# Patient Record
Sex: FEMALE | Race: WHITE | NOT HISPANIC OR LATINO | Employment: FULL TIME | URBAN - METROPOLITAN AREA
[De-identification: names, ages, dates, MRNs, and addresses within clinical notes are randomized per-mention and may not be internally consistent; named-entity substitution may affect disease eponyms.]

---

## 2024-01-16 PROBLEM — R53.83 FATIGUE: Status: ACTIVE | Noted: 2024-01-16

## 2024-01-16 PROBLEM — E89.0 H/O THYROIDECTOMY: Status: ACTIVE | Noted: 2024-01-16

## 2024-01-16 PROBLEM — E05.90 HYPERTHYROIDISM: Status: ACTIVE | Noted: 2024-01-16

## 2024-01-16 PROBLEM — Z98.890 H/O THYROIDECTOMY: Status: ACTIVE | Noted: 2024-01-16

## 2024-01-16 PROBLEM — Z90.89 H/O THYROIDECTOMY: Status: ACTIVE | Noted: 2024-01-16

## 2024-01-16 NOTE — PROGRESS NOTES
"Subjective   Patient ID:   Leyla Villalpando \"J Carlos" is a 33 y.o. female who presents for No chief complaint on file..  HPI  New patient here today to establish care with myself.  Previous PCP:  Last seen:    Fatigue/Hyperthyroidism:  Taking Synthroid.  Symptoms x  DUE for labs.  Has history of thyroidectomy.    Health maintenance:  Smoking:  Labs: Jan 2022. DUE  Influenza:    Review of Systems  12 point review of systems negative unless stated above in HPI    There were no vitals filed for this visit.    Physical Exam  General: Alert and oriented, well nourished, no acute distress.  Lungs: Clear to auscultation, non-labored respiration.  Heart: Normal rate, regular rhythm, no murmur, gallop or edema.  Neurologic: Awake, alert, and oriented X3, CN II-XII intact.  Psychiatric: Cooperative, appropriate mood and affect.    Assessment/Plan   Diagnoses and all orders for this visit:  Hyperthyroidism  H/O thyroidectomy  Fatigue, unspecified type    "

## 2024-01-18 ENCOUNTER — APPOINTMENT (OUTPATIENT)
Dept: PRIMARY CARE | Facility: CLINIC | Age: 34
End: 2024-01-18
Payer: COMMERCIAL

## 2024-01-18 RX ORDER — LEVOTHYROXINE SODIUM 75 UG/1
75 TABLET ORAL
COMMUNITY

## 2024-01-18 RX ORDER — AZELASTINE HCL 205.5 UG/1
SPRAY NASAL 2 TIMES DAILY
COMMUNITY
Start: 2022-03-08 | End: 2024-01-22 | Stop reason: ALTCHOICE

## 2024-01-18 RX ORDER — DROSPIRENONE AND ETHINYL ESTRADIOL 0.02-3(28)
KIT ORAL EVERY 24 HOURS
COMMUNITY
End: 2024-01-18 | Stop reason: SDUPTHER

## 2024-01-18 RX ORDER — DROSPIRENONE AND ETHINYL ESTRADIOL 0.02-3(28)
KIT ORAL
COMMUNITY
End: 2024-04-23 | Stop reason: SDUPTHER

## 2024-01-18 RX ORDER — HYDROCODONE BITARTRATE AND ACETAMINOPHEN 5; 325 MG/1; MG/1
TABLET ORAL
COMMUNITY
End: 2024-01-22 | Stop reason: ALTCHOICE

## 2024-01-18 NOTE — PROGRESS NOTES
"Subjective   Patient ID:   Leyla Villalpando \"J Carlos" is a 33 y.o. female who presents for Establish Care (/).  HPI  New patient here today to establish care with myself.  Previous PCP: Ashley Rico  Last seen: 2022.    Fatigue/Hypothyroidism:  Taking Synthroid.  Symptoms x months.  DUE for labs.  Has history of partial thyroidectomy in 2022.  States she gets at least 7 hours of sleep.  Having increased fatigue.  No recent labs.    Health maintenance:  Smoking: Former smoker.  Labs: Jan 2022. DUE  Influenza:    Review of Systems  12 point review of systems negative unless stated above in HPI    Vitals:    01/22/24 1512   BP: 124/70   Pulse: 86   SpO2: 99%     Physical Exam  General: Alert and oriented, well nourished, no acute distress.  Lungs: Clear to auscultation, non-labored respiration.  Heart: Normal rate, regular rhythm, no murmur, gallop or edema.  Neurologic: Awake, alert, and oriented X3, CN II-XII intact.  Psychiatric: Cooperative, appropriate mood and affect.    Assessment/Plan   It was nice meeting you!  I have ordered some labs to be done as soon as you can.  We will call you with the results.  Consider a sleep study.  If symptoms persist or worsen despite current plan of care, please contact your healthcare provider for further evaluation.  Patient instructed to contact the office if there are any questions regarding their care or treatment.   [facility] [phone]    Fu 6 months for now  Diagnoses and all orders for this visit:  H/O thyroidectomy  Chronic fatigue  -     Comprehensive Metabolic Panel; Future  -     Lipid Panel; Future  -     CBC and Auto Differential; Future  -     Vitamin D 25-Hydroxy,Total (for eval of Vitamin D levels); Future  -     Vitamin B12; Future  -     Iron and TIBC; Future  -     Magnesium; Future  -     TSH with reflex to Free T4 if abnormal; Future  -     Folate; Future  -     Ferritin; Future  -     SANDOR with Reflex to KAVITA; Future  -     Rheumatoid " Factor; Future  -     C-Reactive Protein; Future  -     Sedimentation Rate; Future  Hypothyroidism, unspecified type

## 2024-01-22 ENCOUNTER — OFFICE VISIT (OUTPATIENT)
Dept: PRIMARY CARE | Facility: CLINIC | Age: 34
End: 2024-01-22
Payer: COMMERCIAL

## 2024-01-22 VITALS
BODY MASS INDEX: 27.48 KG/M2 | HEART RATE: 86 BPM | HEIGHT: 66 IN | DIASTOLIC BLOOD PRESSURE: 70 MMHG | SYSTOLIC BLOOD PRESSURE: 124 MMHG | OXYGEN SATURATION: 99 % | WEIGHT: 171 LBS

## 2024-01-22 DIAGNOSIS — E03.9 HYPOTHYROIDISM, UNSPECIFIED TYPE: ICD-10-CM

## 2024-01-22 DIAGNOSIS — R53.82 CHRONIC FATIGUE: ICD-10-CM

## 2024-01-22 DIAGNOSIS — E89.0 H/O THYROIDECTOMY: Primary | ICD-10-CM

## 2024-01-22 PROCEDURE — 1036F TOBACCO NON-USER: CPT | Performed by: PHYSICIAN ASSISTANT

## 2024-01-22 PROCEDURE — 99214 OFFICE O/P EST MOD 30 MIN: CPT | Performed by: PHYSICIAN ASSISTANT

## 2024-01-22 ASSESSMENT — COLUMBIA-SUICIDE SEVERITY RATING SCALE - C-SSRS
2. HAVE YOU ACTUALLY HAD ANY THOUGHTS OF KILLING YOURSELF?: NO
1. IN THE PAST MONTH, HAVE YOU WISHED YOU WERE DEAD OR WISHED YOU COULD GO TO SLEEP AND NOT WAKE UP?: NO
6. HAVE YOU EVER DONE ANYTHING, STARTED TO DO ANYTHING, OR PREPARED TO DO ANYTHING TO END YOUR LIFE?: NO

## 2024-01-22 ASSESSMENT — PATIENT HEALTH QUESTIONNAIRE - PHQ9
1. LITTLE INTEREST OR PLEASURE IN DOING THINGS: NOT AT ALL
2. FEELING DOWN, DEPRESSED OR HOPELESS: NOT AT ALL
SUM OF ALL RESPONSES TO PHQ9 QUESTIONS 1 AND 2: 0

## 2024-01-22 ASSESSMENT — ENCOUNTER SYMPTOMS
LOSS OF SENSATION IN FEET: 0
DEPRESSION: 0
OCCASIONAL FEELINGS OF UNSTEADINESS: 0

## 2024-01-22 ASSESSMENT — PAIN SCALES - GENERAL: PAINLEVEL: 0-NO PAIN

## 2024-01-23 ENCOUNTER — LAB (OUTPATIENT)
Dept: LAB | Facility: LAB | Age: 34
End: 2024-01-23
Payer: COMMERCIAL

## 2024-01-23 DIAGNOSIS — R53.82 CHRONIC FATIGUE: ICD-10-CM

## 2024-01-23 LAB
25(OH)D3 SERPL-MCNC: 26 NG/ML (ref 31–100)
ALBUMIN SERPL-MCNC: 4.4 G/DL (ref 3.5–5)
ALP BLD-CCNC: 44 U/L (ref 35–125)
ALT SERPL-CCNC: 11 U/L (ref 5–40)
ANION GAP SERPL CALC-SCNC: 12 MMOL/L
AST SERPL-CCNC: 15 U/L (ref 5–40)
BASOPHILS # BLD AUTO: 0.05 X10*3/UL (ref 0–0.1)
BASOPHILS NFR BLD AUTO: 0.8 %
BILIRUB SERPL-MCNC: 0.3 MG/DL (ref 0.1–1.2)
BUN SERPL-MCNC: 18 MG/DL (ref 8–25)
CALCIUM SERPL-MCNC: 9.5 MG/DL (ref 8.5–10.4)
CHLORIDE SERPL-SCNC: 105 MMOL/L (ref 97–107)
CHOLEST SERPL-MCNC: 196 MG/DL (ref 133–200)
CHOLEST/HDLC SERPL: 2.2 {RATIO}
CO2 SERPL-SCNC: 24 MMOL/L (ref 24–31)
CREAT SERPL-MCNC: 1.1 MG/DL (ref 0.4–1.6)
CRP SERPL-MCNC: 1.3 MG/DL (ref 0–2)
EGFRCR SERPLBLD CKD-EPI 2021: 68 ML/MIN/1.73M*2
EOSINOPHIL # BLD AUTO: 0.1 X10*3/UL (ref 0–0.7)
EOSINOPHIL NFR BLD AUTO: 1.5 %
ERYTHROCYTE [DISTWIDTH] IN BLOOD BY AUTOMATED COUNT: 12.7 % (ref 11.5–14.5)
ERYTHROCYTE [SEDIMENTATION RATE] IN BLOOD BY WESTERGREN METHOD: 16 MM/H (ref 0–20)
FERRITIN SERPL-MCNC: 89 NG/ML (ref 13–150)
FOLATE SERPL-MCNC: 15.6 NG/ML (ref 4.2–19.9)
GLUCOSE SERPL-MCNC: 75 MG/DL (ref 65–99)
HCT VFR BLD AUTO: 40.4 % (ref 36–46)
HDLC SERPL-MCNC: 89 MG/DL
HGB BLD-MCNC: 13.4 G/DL (ref 12–16)
IMM GRANULOCYTES # BLD AUTO: 0.01 X10*3/UL (ref 0–0.7)
IMM GRANULOCYTES NFR BLD AUTO: 0.2 % (ref 0–0.9)
IRON SATN MFR SERPL: 28 % (ref 12–50)
IRON SERPL-MCNC: 96 UG/DL (ref 30–160)
LDLC SERPL CALC-MCNC: 82 MG/DL (ref 65–130)
LYMPHOCYTES # BLD AUTO: 1.8 X10*3/UL (ref 1.2–4.8)
LYMPHOCYTES NFR BLD AUTO: 27.8 %
MAGNESIUM SERPL-MCNC: 2.2 MG/DL (ref 1.6–3.1)
MCH RBC QN AUTO: 29.3 PG (ref 26–34)
MCHC RBC AUTO-ENTMCNC: 33.2 G/DL (ref 32–36)
MCV RBC AUTO: 88 FL (ref 80–100)
MONOCYTES # BLD AUTO: 0.45 X10*3/UL (ref 0.1–1)
MONOCYTES NFR BLD AUTO: 7 %
NEUTROPHILS # BLD AUTO: 4.06 X10*3/UL (ref 1.2–7.7)
NEUTROPHILS NFR BLD AUTO: 62.7 %
NRBC BLD-RTO: 0 /100 WBCS (ref 0–0)
PLATELET # BLD AUTO: 282 X10*3/UL (ref 150–450)
POTASSIUM SERPL-SCNC: 5.1 MMOL/L (ref 3.4–5.1)
PROT SERPL-MCNC: 7 G/DL (ref 5.9–7.9)
RBC # BLD AUTO: 4.57 X10*6/UL (ref 4–5.2)
RHEUMATOID FACT SER NEPH-ACNC: <10 IU/ML (ref 0–15)
SODIUM SERPL-SCNC: 141 MMOL/L (ref 133–145)
TIBC SERPL-MCNC: 340 UG/DL (ref 228–428)
TRIGL SERPL-MCNC: 127 MG/DL (ref 40–150)
TSH SERPL DL<=0.05 MIU/L-ACNC: 3.38 MIU/L (ref 0.27–4.2)
UIBC SERPL-MCNC: 244 UG/DL (ref 110–370)
VIT B12 SERPL-MCNC: 709 PG/ML (ref 211–946)
WBC # BLD AUTO: 6.5 X10*3/UL (ref 4.4–11.3)

## 2024-01-23 PROCEDURE — 82306 VITAMIN D 25 HYDROXY: CPT

## 2024-01-23 PROCEDURE — 86140 C-REACTIVE PROTEIN: CPT

## 2024-01-23 PROCEDURE — 80053 COMPREHEN METABOLIC PANEL: CPT

## 2024-01-23 PROCEDURE — 80061 LIPID PANEL: CPT

## 2024-01-23 PROCEDURE — 83735 ASSAY OF MAGNESIUM: CPT

## 2024-01-23 PROCEDURE — 84443 ASSAY THYROID STIM HORMONE: CPT

## 2024-01-23 PROCEDURE — 86038 ANTINUCLEAR ANTIBODIES: CPT

## 2024-01-23 PROCEDURE — 83540 ASSAY OF IRON: CPT

## 2024-01-23 PROCEDURE — 86431 RHEUMATOID FACTOR QUANT: CPT

## 2024-01-23 PROCEDURE — 36415 COLL VENOUS BLD VENIPUNCTURE: CPT

## 2024-01-23 PROCEDURE — 85652 RBC SED RATE AUTOMATED: CPT

## 2024-01-23 PROCEDURE — 82746 ASSAY OF FOLIC ACID SERUM: CPT

## 2024-01-23 PROCEDURE — 82728 ASSAY OF FERRITIN: CPT

## 2024-01-23 PROCEDURE — 85025 COMPLETE CBC W/AUTO DIFF WBC: CPT

## 2024-01-23 PROCEDURE — 83550 IRON BINDING TEST: CPT

## 2024-01-23 PROCEDURE — 82607 VITAMIN B-12: CPT

## 2024-01-23 NOTE — RESULT ENCOUNTER NOTE
Vitamin D is slightly low- I do recommend a 5k daily OTC supplement. All other labs, including thyroid, have been normal so far. Still waiting on 2 more

## 2024-01-23 NOTE — LETTER
"January 23, 2024     Aminta Correiaton  [address] [place] [address]      Dear Ms. Villalpando:    Below are the results from your recent visit:   Vitamin D is slightly low- I do recommend a 5000 Units daily Over The Counter supplement. All other labs, including thyroid, have been normal so far. Still waiting on 2 more results.     Resulted Orders   Comprehensive Metabolic Panel   Result Value Ref Range    Glucose 75 65 - 99 mg/dL    Sodium 141 133 - 145 mmol/L    Potassium 5.1 3.4 - 5.1 mmol/L    Chloride 105 97 - 107 mmol/L    Bicarbonate 24 24 - 31 mmol/L    Urea Nitrogen 18 8 - 25 mg/dL    Creatinine 1.10 0.40 - 1.60 mg/dL    eGFR 68 >60 mL/min/1.73m*2      Comment:      Calculations of estimated GFR are performed using the 2021 CKD-EPI Study Refit equation without the race variable for the IDMS-Traceable creatinine methods.  https://jasn.asnjournals.org/content/early/2021/09/22/ASN.2618949691    Calcium 9.5 8.5 - 10.4 mg/dL    Albumin 4.4 3.5 - 5.0 g/dL    Alkaline Phosphatase 44 35 - 125 U/L    Total Protein 7.0 5.9 - 7.9 g/dL    AST 15 5 - 40 U/L    Bilirubin, Total 0.3 0.1 - 1.2 mg/dL    ALT 11 5 - 40 U/L    Anion Gap 12 <=19 mmol/L   Lipid Panel   Result Value Ref Range    Cholesterol 196 133 - 200 mg/dL    HDL-Cholesterol 89.0 >50.0 mg/dL      Comment:      National Cholesterol Education Program (NCFP) guidelines:  <40 mg/dL: Low HDL-cholesterol (major risk factor for CHD)  >60 mg/dL: High HDL-cholesterol (\"negative\"risk factor for CHD)  HDL-cholesterol is affected by a number of factors (e.g., smoking, exercise, hormones, sex and age).      Cholesterol/HDL Ratio 2.2 SEE COMMENT      Comment:      According to the American Heart Association, the goal is to maintain the total Cholesterol/HDL ratio at 5-to-1, or lower, with an optimum ratio of 3.5-to-1.    LDL Calculated 82 65 - 130 mg/dL    Triglycerides 127 40 - 150 mg/dL   CBC and Auto Differential   Result Value Ref Range    WBC 6.5 4.4 - 11.3 x10*3/uL    " nRBC 0.0 0.0 - 0.0 /100 WBCs    RBC 4.57 4.00 - 5.20 x10*6/uL    Hemoglobin 13.4 12.0 - 16.0 g/dL    Hematocrit 40.4 36.0 - 46.0 %    MCV 88 80 - 100 fL    MCH 29.3 26.0 - 34.0 pg    MCHC 33.2 32.0 - 36.0 g/dL    RDW 12.7 11.5 - 14.5 %    Platelets 282 150 - 450 x10*3/uL    Neutrophils % 62.7 40.0 - 80.0 %    Immature Granulocytes %, Automated 0.2 0.0 - 0.9 %      Comment:      Immature Granulocyte Count (IG) includes promyelocytes, myelocytes and metamyelocytes but does not include bands. Percent differential counts (%) should be interpreted in the context of the absolute cell counts (cells/UL).    Lymphocytes % 27.8 13.0 - 44.0 %    Monocytes % 7.0 2.0 - 10.0 %    Eosinophils % 1.5 0.0 - 6.0 %    Basophils % 0.8 0.0 - 2.0 %    Neutrophils Absolute 4.06 1.20 - 7.70 x10*3/uL      Comment:      Percent differential counts (%) should be interpreted in the context of the absolute cell counts (cells/uL).    Immature Granulocytes Absolute, Automated 0.01 0.00 - 0.70 x10*3/uL    Lymphocytes Absolute 1.80 1.20 - 4.80 x10*3/uL    Monocytes Absolute 0.45 0.10 - 1.00 x10*3/uL    Eosinophils Absolute 0.10 0.00 - 0.70 x10*3/uL    Basophils Absolute 0.05 0.00 - 0.10 x10*3/uL   Vitamin D 25-Hydroxy,Total (for eval of Vitamin D levels)   Result Value Ref Range    Vitamin D, 25-Hydroxy, Total 26 (L) 31 - 100 ng/mL   Vitamin B12   Result Value Ref Range    Vitamin B12 709 211 - 946 pg/mL    Narrative    Deficient= <174 pg/ml  Indeterminate= 175-242 pg/ml   Iron and TIBC   Result Value Ref Range    Iron 96 30 - 160 ug/dL    UIBC 244 110 - 370 ug/dL    TIBC 340 228 - 428 ug/dL    % Saturation 28 12 - 50 %   Magnesium   Result Value Ref Range    Magnesium 2.20 1.60 - 3.10 mg/dL   TSH with reflex to Free T4 if abnormal   Result Value Ref Range    Thyroid Stimulating Hormone 3.38 0.27 - 4.20 mIU/L    Narrative    TSH testing is performed using different testing methodology at [facility] than at other [facility].  Direct result comparisons should only be made within the same method.     Folate   Result Value Ref Range    Folate, Serum 15.6 4.2 - 19.9 ng/mL    Narrative    Deficient=    <2.1 ng/ml  Indeterminate= 2.2-4.1 ng/ml     Ferritin   Result Value Ref Range    Ferritin 89 13 - 150 ng/mL   C-Reactive Protein   Result Value Ref Range    C-Reactive Protein 1.30 0.00 - 2.00 mg/dL   Sedimentation Rate   Result Value Ref Range    Sedimentation Rate 16 0 - 20 mm/h     The test results show that your current treatment is working. Please continue your current medication and plan. .    If you have any questions or concerns, please don't hesitate to call.         Sincerely,    Wood An PA-C

## 2024-01-23 NOTE — LETTER
"January 25, 2024     Aminta Correiaton  [address] [place] [address]      Dear Ms. Villalpando:    Below are the results from your recent visit:  Rheumatoid factor and SANDOR are both NEGATIVE.     Resulted Orders   Comprehensive Metabolic Panel   Result Value Ref Range    Glucose 75 65 - 99 mg/dL    Sodium 141 133 - 145 mmol/L    Potassium 5.1 3.4 - 5.1 mmol/L    Chloride 105 97 - 107 mmol/L    Bicarbonate 24 24 - 31 mmol/L    Urea Nitrogen 18 8 - 25 mg/dL    Creatinine 1.10 0.40 - 1.60 mg/dL    eGFR 68 >60 mL/min/1.73m*2      Comment:      Calculations of estimated GFR are performed using the 2021 CKD-EPI Study Refit equation without the race variable for the IDMS-Traceable creatinine methods.  https://jasn.asnjournals.org/content/early/2021/09/22/ASN.0506941924    Calcium 9.5 8.5 - 10.4 mg/dL    Albumin 4.4 3.5 - 5.0 g/dL    Alkaline Phosphatase 44 35 - 125 U/L    Total Protein 7.0 5.9 - 7.9 g/dL    AST 15 5 - 40 U/L    Bilirubin, Total 0.3 0.1 - 1.2 mg/dL    ALT 11 5 - 40 U/L    Anion Gap 12 <=19 mmol/L   Lipid Panel   Result Value Ref Range    Cholesterol 196 133 - 200 mg/dL    HDL-Cholesterol 89.0 >50.0 mg/dL      Comment:      National Cholesterol Education Program (NCFP) guidelines:  <40 mg/dL: Low HDL-cholesterol (major risk factor for CHD)  >60 mg/dL: High HDL-cholesterol (\"negative\"risk factor for CHD)  HDL-cholesterol is affected by a number of factors (e.g., smoking, exercise, hormones, sex and age).      Cholesterol/HDL Ratio 2.2 SEE COMMENT      Comment:      According to the American Heart Association, the goal is to maintain the total Cholesterol/HDL ratio at 5-to-1, or lower, with an optimum ratio of 3.5-to-1.    LDL Calculated 82 65 - 130 mg/dL    Triglycerides 127 40 - 150 mg/dL   CBC and Auto Differential   Result Value Ref Range    WBC 6.5 4.4 - 11.3 x10*3/uL    nRBC 0.0 0.0 - 0.0 /100 WBCs    RBC 4.57 4.00 - 5.20 x10*6/uL    Hemoglobin 13.4 12.0 - 16.0 g/dL    Hematocrit 40.4 36.0 - 46.0 %    MCV " 88 80 - 100 fL    MCH 29.3 26.0 - 34.0 pg    MCHC 33.2 32.0 - 36.0 g/dL    RDW 12.7 11.5 - 14.5 %    Platelets 282 150 - 450 x10*3/uL    Neutrophils % 62.7 40.0 - 80.0 %    Immature Granulocytes %, Automated 0.2 0.0 - 0.9 %      Comment:      Immature Granulocyte Count (IG) includes promyelocytes, myelocytes and metamyelocytes but does not include bands. Percent differential counts (%) should be interpreted in the context of the absolute cell counts (cells/UL).    Lymphocytes % 27.8 13.0 - 44.0 %    Monocytes % 7.0 2.0 - 10.0 %    Eosinophils % 1.5 0.0 - 6.0 %    Basophils % 0.8 0.0 - 2.0 %    Neutrophils Absolute 4.06 1.20 - 7.70 x10*3/uL      Comment:      Percent differential counts (%) should be interpreted in the context of the absolute cell counts (cells/uL).    Immature Granulocytes Absolute, Automated 0.01 0.00 - 0.70 x10*3/uL    Lymphocytes Absolute 1.80 1.20 - 4.80 x10*3/uL    Monocytes Absolute 0.45 0.10 - 1.00 x10*3/uL    Eosinophils Absolute 0.10 0.00 - 0.70 x10*3/uL    Basophils Absolute 0.05 0.00 - 0.10 x10*3/uL   Vitamin D 25-Hydroxy,Total (for eval of Vitamin D levels)   Result Value Ref Range    Vitamin D, 25-Hydroxy, Total 26 (L) 31 - 100 ng/mL   Vitamin B12   Result Value Ref Range    Vitamin B12 709 211 - 946 pg/mL    Narrative    Deficient= <174 pg/ml  Indeterminate= 175-242 pg/ml   Iron and TIBC   Result Value Ref Range    Iron 96 30 - 160 ug/dL    UIBC 244 110 - 370 ug/dL    TIBC 340 228 - 428 ug/dL    % Saturation 28 12 - 50 %   Magnesium   Result Value Ref Range    Magnesium 2.20 1.60 - 3.10 mg/dL   TSH with reflex to Free T4 if abnormal   Result Value Ref Range    Thyroid Stimulating Hormone 3.38 0.27 - 4.20 mIU/L    Narrative    TSH testing is performed using different testing methodology at [facility] than at other [facility]. Direct result comparisons should only be made within the same method.     Folate   Result Value Ref Range    Folate, Serum 15.6 4.2 - 19.9  ng/mL    Narrative    Deficient=    <2.1 ng/ml  Indeterminate= 2.2-4.1 ng/ml     Ferritin   Result Value Ref Range    Ferritin 89 13 - 150 ng/mL   SANDOR with Reflex to KAVITA   Result Value Ref Range    SANDOR Negative Negative      Comment:      The Antinuclear Antibody (SANDOR) test was performed using  indirect immunofluorescence assay with HEp-2 cells slide.   Rheumatoid Factor   Result Value Ref Range    Rheumatoid Factor <10 0 - 15 IU/mL   C-Reactive Protein   Result Value Ref Range    C-Reactive Protein 1.30 0.00 - 2.00 mg/dL   Sedimentation Rate   Result Value Ref Range    Sedimentation Rate 16 0 - 20 mm/h     The test results show that your current treatment is working. Please continue your current medication and plan. If you have any questions or concerns, please don't hesitate to call.  Sincerely,  Wood An PA-C

## 2024-01-24 LAB — ANA SER QL HEP2 SUBST: NEGATIVE

## 2024-02-22 ENCOUNTER — OFFICE VISIT (OUTPATIENT)
Dept: PRIMARY CARE | Facility: CLINIC | Age: 34
End: 2024-02-22
Payer: COMMERCIAL

## 2024-02-22 VITALS
SYSTOLIC BLOOD PRESSURE: 108 MMHG | BODY MASS INDEX: 27.8 KG/M2 | OXYGEN SATURATION: 98 % | WEIGHT: 173 LBS | HEIGHT: 66 IN | HEART RATE: 78 BPM | DIASTOLIC BLOOD PRESSURE: 82 MMHG

## 2024-02-22 DIAGNOSIS — F33.41 RECURRENT MAJOR DEPRESSIVE DISORDER, IN PARTIAL REMISSION (CMS-HCC): ICD-10-CM

## 2024-02-22 DIAGNOSIS — E03.9 HYPOTHYROIDISM, UNSPECIFIED TYPE: ICD-10-CM

## 2024-02-22 DIAGNOSIS — F41.9 ANXIETY: ICD-10-CM

## 2024-02-22 DIAGNOSIS — E89.0 H/O THYROIDECTOMY: Primary | ICD-10-CM

## 2024-02-22 DIAGNOSIS — R53.82 CHRONIC FATIGUE: ICD-10-CM

## 2024-02-22 PROCEDURE — 1036F TOBACCO NON-USER: CPT | Performed by: PHYSICIAN ASSISTANT

## 2024-02-22 PROCEDURE — 99214 OFFICE O/P EST MOD 30 MIN: CPT | Performed by: PHYSICIAN ASSISTANT

## 2024-02-22 RX ORDER — SERTRALINE HYDROCHLORIDE 25 MG/1
25 TABLET, FILM COATED ORAL DAILY
Qty: 30 TABLET | Refills: 1 | Status: SHIPPED | OUTPATIENT
Start: 2024-02-22 | End: 2024-03-18

## 2024-02-22 ASSESSMENT — PATIENT HEALTH QUESTIONNAIRE - PHQ9
2. FEELING DOWN, DEPRESSED OR HOPELESS: NEARLY EVERY DAY
SUM OF ALL RESPONSES TO PHQ QUESTIONS 1-9: 23
9. THOUGHTS THAT YOU WOULD BE BETTER OFF DEAD, OR OF HURTING YOURSELF: SEVERAL DAYS
10. IF YOU CHECKED OFF ANY PROBLEMS, HOW DIFFICULT HAVE THESE PROBLEMS MADE IT FOR YOU TO DO YOUR WORK, TAKE CARE OF THINGS AT HOME, OR GET ALONG WITH OTHER PEOPLE: VERY DIFFICULT
SUM OF ALL RESPONSES TO PHQ9 QUESTIONS 1 AND 2: 6
1. LITTLE INTEREST OR PLEASURE IN DOING THINGS: NEARLY EVERY DAY
4. FEELING TIRED OR HAVING LITTLE ENERGY: NEARLY EVERY DAY
6. FEELING BAD ABOUT YOURSELF - OR THAT YOU ARE A FAILURE OR HAVE LET YOURSELF OR YOUR FAMILY DOWN: NEARLY EVERY DAY
7. TROUBLE CONCENTRATING ON THINGS, SUCH AS READING THE NEWSPAPER OR WATCHING TELEVISION: NEARLY EVERY DAY
3. TROUBLE FALLING OR STAYING ASLEEP OR SLEEPING TOO MUCH: NEARLY EVERY DAY
5. POOR APPETITE OR OVEREATING: SEVERAL DAYS
8. MOVING OR SPEAKING SO SLOWLY THAT OTHER PEOPLE COULD HAVE NOTICED. OR THE OPPOSITE, BEING SO FIGETY OR RESTLESS THAT YOU HAVE BEEN MOVING AROUND A LOT MORE THAN USUAL: NEARLY EVERY DAY

## 2024-02-22 ASSESSMENT — PAIN SCALES - GENERAL: PAINLEVEL: 0-NO PAIN

## 2024-02-22 ASSESSMENT — ENCOUNTER SYMPTOMS
LOSS OF SENSATION IN FEET: 0
DEPRESSION: 0
OCCASIONAL FEELINGS OF UNSTEADINESS: 0

## 2024-02-22 NOTE — PROGRESS NOTES
"Subjective   Patient ID:   Leyla Villalpando \"J Carlos" is a 33 y.o. female who presents for Depression.  HPI  Depression/Anxiety:  Symptoms x years.  Thinks is getting worse.  Seeing a therapist as well.  She does have off and on suicidal thoughts.  No concrete plans.  Has never been on medication for this before.  Affecting everyday life.    Fatigue/Hypothyroidism:  Taking Synthroid.  Symptoms x months.  Has history of partial thyroidectomy in 2022.  States she gets at least 7 hours of sleep.  Having increased fatigue.  Labs showed slightly low vit D in Jan 2024.    Health maintenance:  Smoking: Former smoker.  Labs: Jan 2024  Influenza:    Review of Systems  12 point review of systems negative unless stated above in HPI    Vitals:    02/22/24 1050   BP: 108/82   Pulse: 78   SpO2: 98%     Physical Exam  General: Alert and oriented, well nourished, no acute distress.  Lungs: Clear to auscultation, non-labored respiration.  Heart: Normal rate, regular rhythm, no murmur, gallop or edema.  Neurologic: Awake, alert, and oriented X3, CN II-XII intact.  Psychiatric: Cooperative, appropriate mood and affect.    Assessment/Plan   It was good seeing you today!  We dicussed options to treat anxiety and depression.  I have sent in low dose Zoloft as a starting point.  This may take 3-4 weeks before noticing the full effects.  We can add in an as needed medication next visit depending on how the Zoloft goes.  Continue seeing the therapist.  If you do have thoughts to harm yourself or others, please call 911/go to the nearest emergency room.  If symptoms persist or worsen despite current plan of care, please contact your healthcare provider for further evaluation.  Patient instructed to contact the office if there are any questions regarding their care or treatment.   [facility] [phone]    Fu 1 month or sooner  Diagnoses and all orders for this visit:  H/O thyroidectomy  Hypothyroidism, unspecified " type  Chronic fatigue  Recurrent major depressive disorder, in partial remission (CMS/HCC)  -     sertraline (Zoloft) 25 mg tablet; Take 1 tablet (25 mg) by mouth once daily.  Anxiety  -     sertraline (Zoloft) 25 mg tablet; Take 1 tablet (25 mg) by mouth once daily.

## 2024-03-11 NOTE — PROGRESS NOTES
"Subjective   Patient ID:   Leyla Villalpando \"J Carlos" is a 33 y.o. female who presents for Follow-up.  HPI  Depression/Anxiety:  We started Zoloft last visit.  This seems to be working well!  Feels the current dose is adequate.  Symptoms x years.  Seeing a therapist as well.  Had never been on medication for this before.  Affecting everyday life.  Denies SI/HI.    Fatigue/Hypothyroidism:  Taking Synthroid.  Last checked Jan 2024.  Symptoms x months.  Has history of partial thyroidectomy in 2022.  States she gets at least 7 hours of sleep.  Having increased fatigue.  Labs showed slightly low vit D in Jan 2024.    Health maintenance:  Smoking: Former smoker.  Labs: Jan 2024  Influenza:    Review of Systems  12 point review of systems negative unless stated above in HPI    Vitals:    03/21/24 1027   BP: 110/78   Pulse: 73   SpO2: 99%     Physical Exam  General: Alert and oriented, well nourished, no acute distress.  Lungs: Clear to auscultation, non-labored respiration.  Heart: Normal rate, regular rhythm, no murmur, gallop or edema.  Neurologic: Awake, alert, and oriented X3, CN II-XII intact.  Psychiatric: Cooperative, appropriate mood and affect.    Assessment/Plan   It was good seeing you!  I am glad the Zoloft if helping!  Please let me know if you feel you need a dosage increase.  We will re-check your thyroid next visit.  Continue the same medications.  Chronic conditions are stable.  Call with questions or concerns.    Follow up  As scheduled  Diagnoses and all orders for this visit:  Recurrent major depressive disorder, in partial remission (CMS/HCC)  Anxiety  Hypothyroidism, unspecified type  H/O thyroidectomy  Chronic fatigue    "

## 2024-03-15 DIAGNOSIS — F33.41 RECURRENT MAJOR DEPRESSIVE DISORDER, IN PARTIAL REMISSION (CMS-HCC): ICD-10-CM

## 2024-03-15 DIAGNOSIS — F41.9 ANXIETY: ICD-10-CM

## 2024-03-18 RX ORDER — SERTRALINE HYDROCHLORIDE 25 MG/1
25 TABLET, FILM COATED ORAL DAILY
Qty: 90 TABLET | Refills: 1 | Status: SHIPPED | OUTPATIENT
Start: 2024-03-18

## 2024-03-21 ENCOUNTER — OFFICE VISIT (OUTPATIENT)
Dept: PRIMARY CARE | Facility: CLINIC | Age: 34
End: 2024-03-21
Payer: COMMERCIAL

## 2024-03-21 VITALS
SYSTOLIC BLOOD PRESSURE: 110 MMHG | HEIGHT: 66 IN | WEIGHT: 167 LBS | BODY MASS INDEX: 26.84 KG/M2 | OXYGEN SATURATION: 99 % | HEART RATE: 73 BPM | DIASTOLIC BLOOD PRESSURE: 78 MMHG

## 2024-03-21 DIAGNOSIS — F41.9 ANXIETY: ICD-10-CM

## 2024-03-21 DIAGNOSIS — E89.0 H/O THYROIDECTOMY: ICD-10-CM

## 2024-03-21 DIAGNOSIS — F33.41 RECURRENT MAJOR DEPRESSIVE DISORDER, IN PARTIAL REMISSION (CMS-HCC): Primary | ICD-10-CM

## 2024-03-21 DIAGNOSIS — E03.9 HYPOTHYROIDISM, UNSPECIFIED TYPE: ICD-10-CM

## 2024-03-21 DIAGNOSIS — R53.82 CHRONIC FATIGUE: ICD-10-CM

## 2024-03-21 PROCEDURE — 1036F TOBACCO NON-USER: CPT | Performed by: PHYSICIAN ASSISTANT

## 2024-03-21 PROCEDURE — 99213 OFFICE O/P EST LOW 20 MIN: CPT | Performed by: PHYSICIAN ASSISTANT

## 2024-03-21 ASSESSMENT — PATIENT HEALTH QUESTIONNAIRE - PHQ9
1. LITTLE INTEREST OR PLEASURE IN DOING THINGS: NOT AT ALL
SUM OF ALL RESPONSES TO PHQ9 QUESTIONS 1 AND 2: 0
2. FEELING DOWN, DEPRESSED OR HOPELESS: NOT AT ALL

## 2024-03-21 ASSESSMENT — ENCOUNTER SYMPTOMS
DEPRESSION: 0
LOSS OF SENSATION IN FEET: 0
OCCASIONAL FEELINGS OF UNSTEADINESS: 0

## 2024-03-21 ASSESSMENT — PAIN SCALES - GENERAL: PAINLEVEL: 0-NO PAIN

## 2024-04-20 DIAGNOSIS — Z30.41 FAMILY PLANNING, BCP (BIRTH CONTROL PILLS) MAINTENANCE: Primary | ICD-10-CM

## 2024-04-22 RX ORDER — DROSPIRENONE AND ETHINYL ESTRADIOL 0.02-3(28)
1 KIT ORAL DAILY
Qty: 84 TABLET | Refills: 3 | OUTPATIENT
Start: 2024-04-22 | End: 2024-07-21

## 2024-04-23 RX ORDER — DROSPIRENONE AND ETHINYL ESTRADIOL 0.02-3(28)
1 KIT ORAL DAILY
Qty: 28 TABLET | Refills: 1 | Status: SHIPPED | OUTPATIENT
Start: 2024-04-23 | End: 2024-04-24 | Stop reason: SDUPTHER

## 2024-04-24 ENCOUNTER — TELEPHONE (OUTPATIENT)
Dept: OBSTETRICS AND GYNECOLOGY | Facility: CLINIC | Age: 34
End: 2024-04-24
Payer: COMMERCIAL

## 2024-04-24 DIAGNOSIS — Z30.41 FAMILY PLANNING, BCP (BIRTH CONTROL PILLS) MAINTENANCE: ICD-10-CM

## 2024-04-24 RX ORDER — DROSPIRENONE AND ETHINYL ESTRADIOL 0.02-3(28)
1 KIT ORAL DAILY
Qty: 90 TABLET | Refills: 0 | Status: SHIPPED | OUTPATIENT
Start: 2024-04-24 | End: 2024-05-22 | Stop reason: SDUPTHER

## 2024-04-24 NOTE — TELEPHONE ENCOUNTER
She was prescribed enough to get her to her appt.  Will resend her 90 day supply with no refills. No additional refills until pt is seen.

## 2024-04-24 NOTE — TELEPHONE ENCOUNTER
Pt called saying pharmacy will only cover a 90 day supply on her bc pills, pt spoke with pharmacy and they told her she was only refilled a 30 day supply. Pt has annual set for 05/22

## 2024-05-22 ENCOUNTER — OFFICE VISIT (OUTPATIENT)
Dept: OBSTETRICS AND GYNECOLOGY | Facility: CLINIC | Age: 34
End: 2024-05-22
Payer: COMMERCIAL

## 2024-05-22 VITALS
WEIGHT: 168 LBS | BODY MASS INDEX: 27 KG/M2 | HEIGHT: 66 IN | DIASTOLIC BLOOD PRESSURE: 74 MMHG | SYSTOLIC BLOOD PRESSURE: 122 MMHG

## 2024-05-22 DIAGNOSIS — Z30.41 FAMILY PLANNING, BCP (BIRTH CONTROL PILLS) MAINTENANCE: Primary | ICD-10-CM

## 2024-05-22 DIAGNOSIS — Z01.419 WELL WOMAN EXAM WITH ROUTINE GYNECOLOGICAL EXAM: ICD-10-CM

## 2024-05-22 PROCEDURE — 99395 PREV VISIT EST AGE 18-39: CPT | Performed by: OBSTETRICS & GYNECOLOGY

## 2024-05-22 RX ORDER — SPIRONOLACTONE 100 MG/1
100 TABLET, FILM COATED ORAL DAILY
COMMUNITY
End: 2024-05-22 | Stop reason: ENTERED-IN-ERROR

## 2024-05-22 RX ORDER — DROSPIRENONE AND ETHINYL ESTRADIOL 0.02-3(28)
1 KIT ORAL DAILY
Qty: 90 TABLET | Refills: 3 | Status: SHIPPED | OUTPATIENT
Start: 2024-05-22 | End: 2024-05-22

## 2024-05-22 RX ORDER — DESOGESTREL AND ETHINYL ESTRADIOL 21-5 (28)
1 KIT ORAL DAILY
Qty: 90 TABLET | Refills: 3 | Status: SHIPPED | OUTPATIENT
Start: 2024-05-22 | End: 2024-05-22 | Stop reason: ENTERED-IN-ERROR

## 2024-05-22 RX ORDER — DROSPIRENONE AND ETHINYL ESTRADIOL 0.02-3(28)
1 KIT ORAL DAILY
Qty: 90 TABLET | Refills: 3 | Status: SHIPPED | OUTPATIENT
Start: 2024-05-22 | End: 2025-05-22

## 2024-05-22 ASSESSMENT — PAIN SCALES - GENERAL: PAINLEVEL: 0-NO PAIN

## 2024-05-22 ASSESSMENT — ENCOUNTER SYMPTOMS
DEPRESSION: 0
OCCASIONAL FEELINGS OF UNSTEADINESS: 0
LOSS OF SENSATION IN FEET: 0

## 2024-05-22 NOTE — PROGRESS NOTES
"Estela Villalpando \"J Carlos" is a 33 y.o. female who is here for a routine exam. Periods are regular every 28-30 days, lasting a few days. Dysmenorrhea:none. Cyclic symptoms include none. No intermenstrual bleeding, spotting, or discharge.  Currently on menses, declines pelvic.    On OCP, with BTB and emotional lability      Last pap:   neg/neg   Last mammogram  Current contraception: OCP (estrogen/progesterone)  History of abnormal Pap smear: no  Family history of uterine or ovarian cancer: no  Regular self breast exam: yes  History of abnormal mammogram: no  Family history of breast cancer: no  History of abnormal lipids: no  Menstrual History:  OB History          1    Para        Term                AB   1    Living             SAB        IAB   1    Ectopic        Multiple        Live Births                      Patient's last menstrual period was 2024 (approximate).         Past Medical History:   Diagnosis Date    Anxiety     Depression     H/O partial thyroidectomy        Past Surgical History:   Procedure Laterality Date    THYROIDECTOMY, PARTIAL      WISDOM TOOTH EXTRACTION         Social History     Socioeconomic History    Marital status: Significant Other     Spouse name: Not on file    Number of children: 0    Years of education: Not on file    Highest education level: Not on file   Occupational History    Not on file   Tobacco Use    Smoking status: Former     Current packs/day: 0.00     Average packs/day: 0.3 packs/day for 10.0 years (2.5 ttl pk-yrs)     Types: Cigarettes     Start date:      Quit date: 2019     Years since quittin.4     Passive exposure: Never    Smokeless tobacco: Never   Vaping Use    Vaping status: Never Used   Substance and Sexual Activity    Alcohol use: Yes     Comment: SOCIAL    Drug use: Never    Sexual activity: Yes     Partners: Male     Birth control/protection: OCP   Other Topics Concern    Not on file   Social History Narrative    " "Not on file     Social Determinants of Health     Financial Resource Strain: Not on file   Food Insecurity: Not on file   Transportation Needs: Not on file   Physical Activity: Not on file   Stress: Not on file   Social Connections: Not on file   Intimate Partner Violence: Not on file   Housing Stability: Not on file       No family history on file.    Prior to Admission medications    Medication Sig Start Date End Date Taking? Authorizing Provider   levothyroxine (Synthroid, Levoxyl) 75 mcg tablet Take 1 tablet (75 mcg) by mouth once daily in the morning. Take before meals.   Yes Historical Provider, MD   sertraline (Zoloft) 25 mg tablet TAKE 1 TABLET BY MOUTH EVERY DAY 3/18/24  Yes Wood An PA-C   drospirenone-ethinyl estradioL (Vestura, 28,) 3-0.02 mg tablet Take 1 tablet by mouth once daily. 4/24/24 5/22/24 Yes Mary Mendoza DO   drospirenone-ethinyl estradioL (Vestura, 28,) 3-0.02 mg tablet Take 1 tablet by mouth once daily. 5/22/24 5/22/25  Mary Mendoza DO       Allergies   Allergen Reactions    Loratadine Hives and Rash    Dog Dander Unknown    Duck Feathers Allergenic Extract Unknown    Grass Pollen-[place], Standard Unknown    House Dust Mite Unknown    Mold Unknown    Other Unknown    Penicillin V Hives     Pill form    Ragweed Unknown    Weed Pollen-Short Ragweed Unknown              Objective   /74   Ht 1.676 m (5' 6\")   Wt 76.2 kg (168 lb)   LMP 05/01/2024 (Approximate)   BMI 27.12 kg/m²     Physical Exam  Vitals and nursing note reviewed.   Constitutional:       General: She is not in acute distress.     Appearance: Normal appearance. She is not ill-appearing.   HENT:      Head: Normocephalic and atraumatic.      Mouth/Throat:      Mouth: Mucous membranes are moist.      Pharynx: Oropharynx is clear.   Eyes:      Extraocular Movements: Extraocular movements intact.      Conjunctiva/sclera: Conjunctivae normal.      Pupils: Pupils are equal, round, and reactive to light. "   Neck:      Thyroid: No thyroid mass, thyromegaly or thyroid tenderness.   Cardiovascular:      Rate and Rhythm: Normal rate and regular rhythm.      Pulses: Normal pulses.      Heart sounds: Normal heart sounds. No murmur heard.  Pulmonary:      Effort: Pulmonary effort is normal.      Breath sounds: No wheezing or rhonchi.   Chest:   Breasts:     Right: Normal. No swelling, bleeding, inverted nipple, mass, nipple discharge, skin change or tenderness.      Left: Normal. No swelling, bleeding, inverted nipple, mass, nipple discharge, skin change or tenderness.   Abdominal:      General: Bowel sounds are normal. There is no distension.      Palpations: There is no mass.      Tenderness: There is no abdominal tenderness. There is no guarding or rebound.      Hernia: No hernia is present. There is no hernia in the left inguinal area or right inguinal area.   Genitourinary:     General: Normal vulva.      Pubic Area: No rash.       Labia:         Right: No rash, tenderness, lesion or injury.         Left: No rash, tenderness, lesion or injury.       Urethra: No prolapse or urethral swelling.      Vagina: No signs of injury. No vaginal discharge, tenderness or prolapsed vaginal walls.      Cervix: No cervical motion tenderness, discharge, friability, lesion, erythema or cervical bleeding.      Uterus: Not deviated, not enlarged, not fixed, not tender and no uterine prolapse.       Adnexa:         Right: No mass, tenderness or fullness.          Left: No mass, tenderness or fullness.     Musculoskeletal:         General: No swelling, tenderness, deformity or signs of injury. Normal range of motion.      Cervical back: No rigidity.   Lymphadenopathy:      Cervical: No cervical adenopathy.      Upper Body:      Right upper body: No axillary adenopathy.      Left upper body: No axillary adenopathy.      Lower Body: No right inguinal adenopathy. No left inguinal adenopathy.   Skin:     General: Skin is warm.      Findings:  No lesion or rash.   Neurological:      General: No focal deficit present.      Mental Status: She is alert and oriented to person, place, and time.   Psychiatric:         Mood and Affect: Mood normal.         Behavior: Behavior normal.         Thought Content: Thought content normal.         Judgment: Judgment normal.         Assessment    Problem List Items Addressed This Visit    None  Visit Diagnoses       Family planning, BCP (birth control pills) maintenance    -  Primary    Relevant Medications    drospirenone-ethinyl estradioL (Vestura, 28,) 3-0.02 mg tablet    Well woman exam with routine gynecological exam                 Follow up in about 1 year (around 5/22/2025) for well woman exam.   All questions answered.  Breast self exam technique reviewed and patient encouraged to perform self-exam monthly.  Contraception: OCP (estrogen/progesterone).  Diagnosis explained in detail, including differential.  Discussed healthy lifestyle modifications..

## 2024-06-18 DIAGNOSIS — F33.41 RECURRENT MAJOR DEPRESSIVE DISORDER, IN PARTIAL REMISSION (CMS-HCC): ICD-10-CM

## 2024-06-18 DIAGNOSIS — F41.9 ANXIETY: ICD-10-CM

## 2024-06-18 RX ORDER — SERTRALINE HYDROCHLORIDE 25 MG/1
25 TABLET, FILM COATED ORAL DAILY
Qty: 90 TABLET | Refills: 1 | Status: SHIPPED | OUTPATIENT
Start: 2024-06-18

## 2024-06-24 NOTE — PROGRESS NOTES
"Subjective   Patient ID:   Leyla Villalpando \"J Carlos" is a 33 y.o. female who presents for Follow-up.  HPI  Depression/Anxiety:  Taking Zoloft.  This seems to be working well!  Feels the current dose is adequate.  Symptoms x years.  Seeing a therapist as well.  Denies SI/HI.    Fatigue/Hypothyroidism:  Taking Synthroid.  Last checked Jan 2024.  DUE for re-check.  Symptoms x months.  Has history of partial thyroidectomy in 2022.  States she gets at least 7 hours of sleep.  Having increased fatigue.  Labs showed slightly low vit D in Jan 2024.    Health maintenance:  Smoking: Former smoker.  Labs: Jan 2024. DUE for TSH  Influenza:    Review of Systems  12 point review of systems negative unless stated above in HPI    Vitals:    07/02/24 0903   BP: 118/80   Pulse: 82   SpO2: 99%     Physical Exam  General: Alert and oriented, well nourished, no acute distress.  Lungs: Clear to auscultation, non-labored respiration.  Heart: Normal rate, regular rhythm, no murmur, gallop or edema.  Neurologic: Awake, alert, and oriented X3, CN II-XII intact.  Psychiatric: Cooperative, appropriate mood and affect.    Assessment/Plan   I am glad you are well!  I have ordered some labs to be done as soon as you can.  We will call you with the results.  We will do your yearly labs with our next visit.  Continue the same medications.  Chronic conditions are stable.  Call with questions or concerns.    Follow up  In Jan for physical  Diagnoses and all orders for this visit:  Recurrent major depressive disorder, in partial remission (CMS-HCC)  Anxiety  Hypothyroidism, unspecified type  -     TSH with reflex to Free T4 if abnormal; Future  H/O thyroidectomy  Chronic fatigue  Vitamin D deficiency    "

## 2024-07-02 ENCOUNTER — OFFICE VISIT (OUTPATIENT)
Dept: PRIMARY CARE | Facility: CLINIC | Age: 34
End: 2024-07-02
Payer: COMMERCIAL

## 2024-07-02 VITALS
SYSTOLIC BLOOD PRESSURE: 118 MMHG | DIASTOLIC BLOOD PRESSURE: 80 MMHG | HEART RATE: 82 BPM | BODY MASS INDEX: 27.54 KG/M2 | OXYGEN SATURATION: 99 % | WEIGHT: 170.6 LBS

## 2024-07-02 DIAGNOSIS — E03.9 HYPOTHYROIDISM, UNSPECIFIED TYPE: ICD-10-CM

## 2024-07-02 DIAGNOSIS — R53.82 CHRONIC FATIGUE: ICD-10-CM

## 2024-07-02 DIAGNOSIS — F33.41 RECURRENT MAJOR DEPRESSIVE DISORDER, IN PARTIAL REMISSION (CMS-HCC): Primary | ICD-10-CM

## 2024-07-02 DIAGNOSIS — E89.0 H/O THYROIDECTOMY: ICD-10-CM

## 2024-07-02 DIAGNOSIS — F41.9 ANXIETY: ICD-10-CM

## 2024-07-02 DIAGNOSIS — E55.9 VITAMIN D DEFICIENCY: ICD-10-CM

## 2024-07-02 PROBLEM — E05.90 HYPERTHYROIDISM: Status: RESOLVED | Noted: 2024-01-16 | Resolved: 2024-07-02

## 2024-07-02 PROCEDURE — 99213 OFFICE O/P EST LOW 20 MIN: CPT | Performed by: PHYSICIAN ASSISTANT

## 2024-07-02 PROCEDURE — 1036F TOBACCO NON-USER: CPT | Performed by: PHYSICIAN ASSISTANT

## 2024-07-02 ASSESSMENT — ENCOUNTER SYMPTOMS
LOSS OF SENSATION IN FEET: 0
DEPRESSION: 0
OCCASIONAL FEELINGS OF UNSTEADINESS: 0

## 2024-07-02 ASSESSMENT — LIFESTYLE VARIABLES
HOW MANY STANDARD DRINKS CONTAINING ALCOHOL DO YOU HAVE ON A TYPICAL DAY: 1 OR 2
HOW OFTEN DO YOU HAVE SIX OR MORE DRINKS ON ONE OCCASION: NEVER
HOW OFTEN DO YOU HAVE A DRINK CONTAINING ALCOHOL: MONTHLY OR LESS
AUDIT-C TOTAL SCORE: 1
SKIP TO QUESTIONS 9-10: 1

## 2024-07-02 ASSESSMENT — PAIN SCALES - GENERAL: PAINLEVEL: 0-NO PAIN

## 2024-07-02 ASSESSMENT — PATIENT HEALTH QUESTIONNAIRE - PHQ9
SUM OF ALL RESPONSES TO PHQ9 QUESTIONS 1 AND 2: 0
1. LITTLE INTEREST OR PLEASURE IN DOING THINGS: NOT AT ALL
2. FEELING DOWN, DEPRESSED OR HOPELESS: NOT AT ALL

## 2024-09-05 DIAGNOSIS — E89.0 H/O THYROIDECTOMY: Primary | ICD-10-CM

## 2024-09-05 RX ORDER — LEVOTHYROXINE SODIUM 75 UG/1
75 TABLET ORAL
Qty: 90 TABLET | Refills: 3 | Status: SHIPPED | OUTPATIENT
Start: 2024-09-05 | End: 2024-12-04

## 2025-01-06 NOTE — PROGRESS NOTES
"Subjective   Patient ID:   Leyla Villalpando \"J Carlos" is a 34 y.o. female who presents for No chief complaint on file..  HPI  Depression/Anxiety:  Taking Zoloft.  This seems to be working well!  Feels the current dose is adequate.  Symptoms x years.  Seeing a therapist as well.  Denies SI/HI.    Fatigue/Hypothyroidism:  Taking Synthroid.  Last checked Jan 2024.  DUE for re-check.  Symptoms x months.  Has history of partial thyroidectomy in 2022.  States she gets at least 7 hours of sleep.  Having increased fatigue.  Labs showed slightly low vit D in Jan 2024.    Health maintenance:  Smoking: Former smoker.  Labs: Jan 2024. DUE  Influenza:    Review of Systems  12 point review of systems negative unless stated above in HPI    There were no vitals filed for this visit.    Physical Exam  General: Alert and oriented, well nourished, no acute distress.  Lungs: Clear to auscultation, non-labored respiration.  Heart: Normal rate, regular rhythm, no murmur, gallop or edema.  Neurologic: Awake, alert, and oriented X3, CN II-XII intact.  Psychiatric: Cooperative, appropriate mood and affect.    Assessment/Plan     Diagnoses and all orders for this visit:  Routine general medical examination at a health care facility  Recurrent major depressive disorder, in partial remission (CMS-HCC)  Anxiety  Hypothyroidism, unspecified type  H/O thyroidectomy  Chronic fatigue  Vitamin D deficiency    "

## 2025-01-10 DIAGNOSIS — F33.41 RECURRENT MAJOR DEPRESSIVE DISORDER, IN PARTIAL REMISSION (CMS-HCC): ICD-10-CM

## 2025-01-10 DIAGNOSIS — F41.9 ANXIETY: ICD-10-CM

## 2025-01-10 RX ORDER — SERTRALINE HYDROCHLORIDE 25 MG/1
25 TABLET, FILM COATED ORAL DAILY
Qty: 90 TABLET | Refills: 1 | Status: SHIPPED | OUTPATIENT
Start: 2025-01-10

## 2025-01-14 ENCOUNTER — OFFICE VISIT (OUTPATIENT)
Dept: PRIMARY CARE | Facility: CLINIC | Age: 35
End: 2025-01-14
Payer: COMMERCIAL

## 2025-01-14 ENCOUNTER — LAB (OUTPATIENT)
Dept: LAB | Facility: LAB | Age: 35
End: 2025-01-14
Payer: COMMERCIAL

## 2025-01-14 VITALS
WEIGHT: 176.2 LBS | HEIGHT: 66 IN | BODY MASS INDEX: 28.32 KG/M2 | HEART RATE: 64 BPM | OXYGEN SATURATION: 99 % | SYSTOLIC BLOOD PRESSURE: 102 MMHG | DIASTOLIC BLOOD PRESSURE: 80 MMHG

## 2025-01-14 DIAGNOSIS — Z00.00 ROUTINE GENERAL MEDICAL EXAMINATION AT A HEALTH CARE FACILITY: ICD-10-CM

## 2025-01-14 DIAGNOSIS — R53.82 CHRONIC FATIGUE: ICD-10-CM

## 2025-01-14 DIAGNOSIS — E03.9 HYPOTHYROIDISM, UNSPECIFIED TYPE: ICD-10-CM

## 2025-01-14 DIAGNOSIS — E55.9 VITAMIN D DEFICIENCY: ICD-10-CM

## 2025-01-14 DIAGNOSIS — Z00.00 ROUTINE GENERAL MEDICAL EXAMINATION AT A HEALTH CARE FACILITY: Primary | ICD-10-CM

## 2025-01-14 DIAGNOSIS — F33.41 RECURRENT MAJOR DEPRESSIVE DISORDER, IN PARTIAL REMISSION (CMS-HCC): ICD-10-CM

## 2025-01-14 DIAGNOSIS — E89.0 H/O THYROIDECTOMY: ICD-10-CM

## 2025-01-14 DIAGNOSIS — F41.9 ANXIETY: ICD-10-CM

## 2025-01-14 LAB
25(OH)D3 SERPL-MCNC: 28 NG/ML (ref 30–100)
ALBUMIN SERPL BCP-MCNC: 4.2 G/DL (ref 3.4–5)
ALP SERPL-CCNC: 34 U/L (ref 33–110)
ALT SERPL W P-5'-P-CCNC: 12 U/L (ref 7–45)
ANION GAP SERPL CALCULATED.3IONS-SCNC: 11 MMOL/L (ref 10–20)
AST SERPL W P-5'-P-CCNC: 16 U/L (ref 9–39)
BASOPHILS # BLD AUTO: 0.04 X10*3/UL (ref 0–0.1)
BASOPHILS NFR BLD AUTO: 0.6 %
BILIRUB SERPL-MCNC: 0.4 MG/DL (ref 0–1.2)
BUN SERPL-MCNC: 17 MG/DL (ref 6–23)
CALCIUM SERPL-MCNC: 9.2 MG/DL (ref 8.6–10.3)
CHLORIDE SERPL-SCNC: 105 MMOL/L (ref 98–107)
CHOLEST SERPL-MCNC: 207 MG/DL (ref 0–199)
CHOLEST/HDLC SERPL: 2.2 {RATIO}
CO2 SERPL-SCNC: 28 MMOL/L (ref 21–32)
CREAT SERPL-MCNC: 1 MG/DL (ref 0.5–1.05)
EGFRCR SERPLBLD CKD-EPI 2021: 76 ML/MIN/1.73M*2
EOSINOPHIL # BLD AUTO: 0.09 X10*3/UL (ref 0–0.7)
EOSINOPHIL NFR BLD AUTO: 1.4 %
ERYTHROCYTE [DISTWIDTH] IN BLOOD BY AUTOMATED COUNT: 12.8 % (ref 11.5–14.5)
GLUCOSE SERPL-MCNC: 81 MG/DL (ref 74–99)
HCT VFR BLD AUTO: 38.3 % (ref 36–46)
HDLC SERPL-MCNC: 92.8 MG/DL
HGB BLD-MCNC: 12.9 G/DL (ref 12–16)
IMM GRANULOCYTES # BLD AUTO: 0.02 X10*3/UL (ref 0–0.7)
IMM GRANULOCYTES NFR BLD AUTO: 0.3 % (ref 0–0.9)
LDLC SERPL CALC-MCNC: 87 MG/DL
LYMPHOCYTES # BLD AUTO: 1.91 X10*3/UL (ref 1.2–4.8)
LYMPHOCYTES NFR BLD AUTO: 28.9 %
MCH RBC QN AUTO: 30.1 PG (ref 26–34)
MCHC RBC AUTO-ENTMCNC: 33.7 G/DL (ref 32–36)
MCV RBC AUTO: 89 FL (ref 80–100)
MONOCYTES # BLD AUTO: 0.44 X10*3/UL (ref 0.1–1)
MONOCYTES NFR BLD AUTO: 6.7 %
NEUTROPHILS # BLD AUTO: 4.1 X10*3/UL (ref 1.2–7.7)
NEUTROPHILS NFR BLD AUTO: 62.1 %
NON HDL CHOLESTEROL: 114 MG/DL (ref 0–149)
NRBC BLD-RTO: 0 /100 WBCS (ref 0–0)
PLATELET # BLD AUTO: 247 X10*3/UL (ref 150–450)
POTASSIUM SERPL-SCNC: 4.8 MMOL/L (ref 3.5–5.3)
PROT SERPL-MCNC: 7 G/DL (ref 6.4–8.2)
RBC # BLD AUTO: 4.29 X10*6/UL (ref 4–5.2)
SODIUM SERPL-SCNC: 139 MMOL/L (ref 136–145)
TRIGL SERPL-MCNC: 134 MG/DL (ref 0–149)
TSH SERPL-ACNC: 3.64 MIU/L (ref 0.44–3.98)
VLDL: 27 MG/DL (ref 0–40)
WBC # BLD AUTO: 6.6 X10*3/UL (ref 4.4–11.3)

## 2025-01-14 PROCEDURE — 99213 OFFICE O/P EST LOW 20 MIN: CPT | Performed by: PHYSICIAN ASSISTANT

## 2025-01-14 PROCEDURE — 80053 COMPREHEN METABOLIC PANEL: CPT

## 2025-01-14 PROCEDURE — 84443 ASSAY THYROID STIM HORMONE: CPT

## 2025-01-14 PROCEDURE — 3008F BODY MASS INDEX DOCD: CPT | Performed by: PHYSICIAN ASSISTANT

## 2025-01-14 PROCEDURE — 1036F TOBACCO NON-USER: CPT | Performed by: PHYSICIAN ASSISTANT

## 2025-01-14 PROCEDURE — 82306 VITAMIN D 25 HYDROXY: CPT

## 2025-01-14 PROCEDURE — 80061 LIPID PANEL: CPT

## 2025-01-14 PROCEDURE — 85025 COMPLETE CBC W/AUTO DIFF WBC: CPT

## 2025-01-14 PROCEDURE — 99395 PREV VISIT EST AGE 18-39: CPT | Performed by: PHYSICIAN ASSISTANT

## 2025-01-14 RX ORDER — BUPROPION HYDROCHLORIDE 150 MG/1
150 TABLET ORAL EVERY MORNING
Qty: 30 TABLET | Refills: 1 | Status: SHIPPED | OUTPATIENT
Start: 2025-01-14 | End: 2025-03-15

## 2025-01-14 ASSESSMENT — PATIENT HEALTH QUESTIONNAIRE - PHQ9
SUM OF ALL RESPONSES TO PHQ9 QUESTIONS 1 AND 2: 0
2. FEELING DOWN, DEPRESSED OR HOPELESS: NOT AT ALL
1. LITTLE INTEREST OR PLEASURE IN DOING THINGS: NOT AT ALL

## 2025-01-14 ASSESSMENT — PAIN SCALES - GENERAL: PAINLEVEL_OUTOF10: 0-NO PAIN

## 2025-01-14 ASSESSMENT — LIFESTYLE VARIABLES
HOW OFTEN DO YOU HAVE A DRINK CONTAINING ALCOHOL: MONTHLY OR LESS
SKIP TO QUESTIONS 9-10: 1
AUDIT-C TOTAL SCORE: 1
HOW MANY STANDARD DRINKS CONTAINING ALCOHOL DO YOU HAVE ON A TYPICAL DAY: PATIENT DOES NOT DRINK
HOW OFTEN DO YOU HAVE SIX OR MORE DRINKS ON ONE OCCASION: NEVER

## 2025-01-14 ASSESSMENT — ENCOUNTER SYMPTOMS
LOSS OF SENSATION IN FEET: 0
OCCASIONAL FEELINGS OF UNSTEADINESS: 0
DEPRESSION: 0

## 2025-01-14 NOTE — PROGRESS NOTES
"Subjective   Patient ID:   Leyla Villalpando \"J Carlos" is a 34 y.o. female who presents for Annual Exam.  HPI  Pain scale: 0 (no pain)  Living will? yes  POA? yes  Are you currently or have you recently been threatened or abused? No  Do you feel unsafe going back to the place you are living? Yes  Reported health: Good  Dental visits? Yes  Hearing problems? No  Vision problems? No  Healthy diet? Yes  Exercise? Exercise 1-3x per week  Adequate fluid intake? Yes    Depression/Anxiety:  Taking Zoloft.  Unsure if this is still helpful.  Having increased depression.  Also has noticed weight gain with the Zoloft.  Symptoms x years.  Seeing a therapist as well.  Denies SI/HI.    Fatigue/Hypothyroidism:  Taking Synthroid.  Last checked 2024.  DUE for re-check.  Symptoms x months.  Has history of partial thyroidectomy in .  States she gets at least 7 hours of sleep.  Having increased fatigue.  Labs showed slightly low vit D in 2024.    Health maintenance:  Smoking: Former smoker.  Labs: 2024. DUE  Influenza:    Social History     Tobacco Use    Smoking status: Former     Current packs/day: 0.00     Average packs/day: 0.3 packs/day for 10.0 years (2.5 ttl pk-yrs)     Types: Cigarettes     Start date:      Quit date: 2019     Years since quittin.0     Passive exposure: Never    Smokeless tobacco: Never   Vaping Use    Vaping status: Never Used   Substance Use Topics    Alcohol use: Yes     Comment: SOCIAL    Drug use: Never       Immunization History   Administered Date(s) Administered    Moderna SARS-CoV-2 Vaccination 2021, 2021, 2022     Review of Systems  12 point review of systems negative unless stated above in HPI    Vitals:    25 0853   BP: 102/80   Pulse: 64   SpO2: 99%     Physical Exam  General: Alert and oriented, well nourished, no acute distress.  Lungs: Clear to auscultation, non-labored respiration.  Heart: Normal rate, regular rhythm, no murmur, gallop or " edema.  Neurologic: Awake, alert, and oriented X3, CN II-XII intact.  Psychiatric: Cooperative, appropriate mood and affect.    Assessment/Plan   It was good seeing you!  This counts as your annual Wellness visit.  Health maintenance was reviewed today.  I have ordered some labs to be done as soon as you can.  We will call you with the results.  Let's STOP the Zoloft.  Let's START Wellbutrin for mood.  This may help with weight loss as well.  If symptoms persist or worsen despite current plan of care, please contact your healthcare provider for further evaluation.  Patient instructed to contact the office if there are any questions regarding their care or treatment.   [facility] [phone]    Fu 1 month for mood  Diagnoses and all orders for this visit:  Routine general medical examination at a health care facility  -     Comprehensive Metabolic Panel; Future  -     Lipid Panel; Future  -     CBC and Auto Differential; Future  Recurrent major depressive disorder, in partial remission (CMS-HCC)  -     buPROPion XL (Wellbutrin XL) 150 mg 24 hr tablet; Take 1 tablet (150 mg) by mouth once daily in the morning. Do not crush, chew, or split.  Anxiety  Hypothyroidism, unspecified type  -     TSH with reflex to Free T4 if abnormal; Future  H/O thyroidectomy  Chronic fatigue  Vitamin D deficiency  -     Vitamin D 25-Hydroxy,Total (for eval of Vitamin D levels); Future

## 2025-02-07 DIAGNOSIS — F33.41 RECURRENT MAJOR DEPRESSIVE DISORDER, IN PARTIAL REMISSION (CMS-HCC): ICD-10-CM

## 2025-02-10 RX ORDER — BUPROPION HYDROCHLORIDE 150 MG/1
150 TABLET ORAL EVERY MORNING
Qty: 90 TABLET | Refills: 1 | Status: SHIPPED | OUTPATIENT
Start: 2025-02-10 | End: 2025-08-09

## 2025-02-10 NOTE — PROGRESS NOTES
"Subjective   Patient ID:   Leyla Villalpando \"J Carlos" is a 34 y.o. female who presents for Follow-up.  HPI  Depression/Anxiety:  We stopped Zoloft due to weight gain/ineffectiveness last visit.  We switched to Wellbutrin.  Symptoms x years.  Seeing a therapist as well.  Denies SI/HI.    Hip pain:  Right sided.  Radiates down to the knee.  No acute injury.  Bothers at rest.  Worse with exertion.  Has been doing more running recently.    Fatigue/Hypothyroidism:  Taking Synthroid.  Last checked 2025.  Symptoms x months.  Has history of partial thyroidectomy in .  States she gets at least 7 hours of sleep.  Having increased fatigue.  Labs showed slightly low vit D in 2025.    Health maintenance:  Smoking: Former smoker.  Labs: 2025.  Influenza:    Social History     Tobacco Use    Smoking status: Former     Current packs/day: 0.00     Average packs/day: 0.3 packs/day for 10.0 years (2.5 ttl pk-yrs)     Types: Cigarettes     Start date:      Quit date:      Years since quittin.1     Passive exposure: Never    Smokeless tobacco: Never   Vaping Use    Vaping status: Never Used   Substance Use Topics    Alcohol use: Yes     Comment: SOCIAL    Drug use: Never       Immunization History   Administered Date(s) Administered    Moderna SARS-CoV-2 Vaccination 2021, 2021, 2022     Review of Systems  12 point review of systems negative unless stated above in HPI    Vitals:    25 0903   BP: 98/62   Pulse: 71   SpO2: 98%     Physical Exam  General: Alert and oriented, well nourished, no acute distress.  Lungs: Clear to auscultation, non-labored respiration.  Heart: Normal rate, regular rhythm, no murmur, gallop or edema.  Neurologic: Awake, alert, and oriented X3, CN II-XII intact.  Psychiatric: Cooperative, appropriate mood and affect.    Assessment/Plan   It was good seeing you!  I am glad the Wellbutrin is helping!  I have placed a referral to physical therapy for further " management.  Consider imaging/ortho as well.  We will check your thyroid next visit.  If symptoms persist or worsen despite current plan of care, please contact your healthcare provider for further evaluation.  Patient instructed to contact the office if there are any questions regarding their care or treatment.   [facility] [phone]  Continue the same medications.  Chronic conditions are stable.  Call with questions or concerns.    Follow up  6 months  Diagnoses and all orders for this visit:  Recurrent major depressive disorder, in partial remission (CMS-HCC)  Anxiety  Hypothyroidism, unspecified type  H/O thyroidectomy  -     levothyroxine (Synthroid, Levoxyl) 75 mcg tablet; Take 1 tablet (75 mcg) by mouth once daily in the morning. Take before meals.  Chronic fatigue  Vitamin D deficiency  Right hip pain  -     Referral to Physical Therapy; Future

## 2025-02-17 ENCOUNTER — OFFICE VISIT (OUTPATIENT)
Dept: PRIMARY CARE | Facility: CLINIC | Age: 35
End: 2025-02-17
Payer: COMMERCIAL

## 2025-02-17 VITALS
BODY MASS INDEX: 28.68 KG/M2 | HEART RATE: 71 BPM | SYSTOLIC BLOOD PRESSURE: 98 MMHG | WEIGHT: 175 LBS | DIASTOLIC BLOOD PRESSURE: 62 MMHG | OXYGEN SATURATION: 98 %

## 2025-02-17 DIAGNOSIS — R53.82 CHRONIC FATIGUE: ICD-10-CM

## 2025-02-17 DIAGNOSIS — F33.41 RECURRENT MAJOR DEPRESSIVE DISORDER, IN PARTIAL REMISSION (CMS-HCC): Primary | ICD-10-CM

## 2025-02-17 DIAGNOSIS — F41.9 ANXIETY: ICD-10-CM

## 2025-02-17 DIAGNOSIS — E89.0 H/O THYROIDECTOMY: ICD-10-CM

## 2025-02-17 DIAGNOSIS — M25.551 RIGHT HIP PAIN: ICD-10-CM

## 2025-02-17 DIAGNOSIS — E03.9 HYPOTHYROIDISM, UNSPECIFIED TYPE: ICD-10-CM

## 2025-02-17 DIAGNOSIS — E55.9 VITAMIN D DEFICIENCY: ICD-10-CM

## 2025-02-17 PROCEDURE — 1036F TOBACCO NON-USER: CPT | Performed by: PHYSICIAN ASSISTANT

## 2025-02-17 PROCEDURE — 99213 OFFICE O/P EST LOW 20 MIN: CPT | Performed by: PHYSICIAN ASSISTANT

## 2025-02-17 RX ORDER — LEVOTHYROXINE SODIUM 75 UG/1
75 TABLET ORAL
Qty: 90 TABLET | Refills: 1 | Status: SHIPPED | OUTPATIENT
Start: 2025-02-17 | End: 2025-08-16

## 2025-02-17 ASSESSMENT — PAIN SCALES - GENERAL: PAINLEVEL_OUTOF10: 0-NO PAIN

## 2025-02-17 ASSESSMENT — LIFESTYLE VARIABLES
HOW OFTEN DO YOU HAVE SIX OR MORE DRINKS ON ONE OCCASION: NEVER
HOW MANY STANDARD DRINKS CONTAINING ALCOHOL DO YOU HAVE ON A TYPICAL DAY: 1 OR 2
AUDIT-C TOTAL SCORE: 1
HOW OFTEN DO YOU HAVE A DRINK CONTAINING ALCOHOL: MONTHLY OR LESS
SKIP TO QUESTIONS 9-10: 1

## 2025-02-17 ASSESSMENT — PATIENT HEALTH QUESTIONNAIRE - PHQ9: 1. LITTLE INTEREST OR PLEASURE IN DOING THINGS: NOT AT ALL

## 2025-02-17 ASSESSMENT — ENCOUNTER SYMPTOMS
OCCASIONAL FEELINGS OF UNSTEADINESS: 0
LOSS OF SENSATION IN FEET: 0
DEPRESSION: 0

## 2025-03-05 ENCOUNTER — EVALUATION (OUTPATIENT)
Dept: PHYSICAL THERAPY | Facility: CLINIC | Age: 35
End: 2025-03-05
Payer: COMMERCIAL

## 2025-03-05 DIAGNOSIS — M25.551 RIGHT HIP PAIN: ICD-10-CM

## 2025-03-05 PROCEDURE — 97161 PT EVAL LOW COMPLEX 20 MIN: CPT | Mod: GP | Performed by: PHYSICAL THERAPIST

## 2025-03-05 PROCEDURE — 97110 THERAPEUTIC EXERCISES: CPT | Mod: GP | Performed by: PHYSICAL THERAPIST

## 2025-03-05 PROCEDURE — 97530 THERAPEUTIC ACTIVITIES: CPT | Mod: GP | Performed by: PHYSICAL THERAPIST

## 2025-03-05 ASSESSMENT — ENCOUNTER SYMPTOMS
DEPRESSION: 0
LOSS OF SENSATION IN FEET: 0
OCCASIONAL FEELINGS OF UNSTEADINESS: 0

## 2025-03-05 NOTE — PROGRESS NOTES
"Physical Therapy Evaluation and Treatment      Patient Name: Leyla Villalpando \"J Carlos"  MRN: [id number]  Today's Date: 3/5/2025  Time Calculation  Start Time: 0937  Stop Time: 1015  Time Calculation (min): 38 min  PT Therapeutic Procedures Time Entry  Therapeutic Exercise Time Entry: 14  Therapeutic Activity Time Entry: 10      Insurance:  Visit number: 1 of 4  Authorization info: PENDING CHAT - OK TO BE SEEN   Insurance Type: Payor: [facility] / Plan: [facility] / Product Type: *No Product type* /     Current Problem:   1. Right hip pain  Referral to Physical Therapy    Follow Up In Physical Therapy          Subjective    General:  Patient reports she is having right hip pain/discomfort. Mainly on the outside. Started running in January for [employer]. Right leg feels like her knee is going to buckle. History of right knee discomfort as well and even sometimes into right low back. 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 Used to be in . Able to sleep now without discomfort. Sitting is OK, mainly noticing pain with standing/walking/running. Still running 4x a week and doing strength training on off days. Squats, lunges, pistol squats all difficult. When running downhill she notices impact to be uncomfortable.     Plans to run both 10k and half in May at [employer].    Precautions: none  Pain: 8/10      Objective   ROM   Right hip AROM: WNL  Right knee AROM: WNL      MMT   B LE strength:  Hip flex 4/5  Hip ABD 4/5  Knee flex 4+/5  Knee ext 4+/5      Palpation   TTP right patella, increased distally  TTP right ITB     No edema    Flexibility   Moderate tightness in right TFL and ITB      Special Tests   Hip: FABIOLA: Right negative, John: Right positive, Prasanna: Bilateral positive  Knee: PF Grind: Right positive, Bilateral positive       Transfers   No difficulty, normal technique     Gait   Ambulates with no major deficits. Mild LE in ER, bilaterally. "     Stairs   Ascends and descends stairs with normal pattern      Outcome Measures:  LEFS: 65/80        Treatments:  Therapeutic Exercise: Access Code [email or web address]  SLR with ER  Bridge  Wall squat with ball      Therapeutic activity:  Educated pt on eval findings and POC  Educated pt on anatomy of hip and knee  Discussed running mechanics         Assessment   Assessment:   Pt is a 34 y.o. female with right hip flexor strain with patellar tendonitis. Pt with mild running gait deficits, reduced LE strength, and flexibility restrictions. Pt will benefit from skilled PT to address the above deficits for return to full activities.     (Pt to trial HEP on her own, will return should symptoms persist)       Low complexity due to patient's clinical presentation being stable and uncomplicated by any significant comorbidities that may affect rehab tolerance and progression.     Plan:   Treatment/Interventions: Education/ Instruction, Gait training, Manual therapy, Neuromuscular re-education, Self care/ home management, Taping techniques, Therapeutic activities, Therapeutic exercises  PT Plan: Skilled PT  PT Frequency: 1 time per week  Duration: 3 more visits  Number of Treatments Authorized: ?  Rehab Potential: Good  Plan of Care Agreement: Patient      Goals:   Active       Mobility       Goal 1       Start:  03/05/25    Expected End:  06/03/25       Pt will improve B LE flexibility to WNL to improve all activities          Goal 2       Start:  03/05/25    Expected End:  06/03/25       Pt will improve B LE strength to 5/5 to improve all activities.            Pain       Goal 1       Start:  03/05/25    Expected End:  06/03/25       Pt will perform all running activities with 0/10 pain         Goal 2       Start:  03/05/25    Expected End:  06/03/25       Pt will perform all work tasks with 0/10 pain.

## 2025-06-25 DIAGNOSIS — Z98.890 H/O THYROIDECTOMY: ICD-10-CM

## 2025-06-25 DIAGNOSIS — Z90.89 H/O THYROIDECTOMY: ICD-10-CM

## 2025-06-25 DIAGNOSIS — F33.41 RECURRENT MAJOR DEPRESSIVE DISORDER, IN PARTIAL REMISSION: ICD-10-CM

## 2025-06-25 RX ORDER — BUPROPION HYDROCHLORIDE 150 MG/1
150 TABLET ORAL EVERY MORNING
Qty: 15 TABLET | Refills: 0 | Status: SHIPPED | OUTPATIENT
Start: 2025-06-25 | End: 2025-07-10

## 2025-06-25 RX ORDER — LEVOTHYROXINE SODIUM 75 UG/1
75 TABLET ORAL
Qty: 15 TABLET | Refills: 0 | Status: SHIPPED | OUTPATIENT
Start: 2025-06-25 | End: 2025-07-10

## 2025-06-25 NOTE — TELEPHONE ENCOUNTER
Patient is out of town and just  needs enough meds to get through while she is [place] of state.  Please send to [facility] in [facility]

## 2025-06-30 NOTE — PROGRESS NOTES
"Subjective   Patient ID:   Leyla Villalpando \"J Carlos" is a 34 y.o. female who presents for No chief complaint on file..  HPI  Depression/Anxiety:  We stopped Zoloft due to weight gain/ineffectiveness previously.  We switched to Wellbutrin.  Symptoms x years.  Seeing a therapist as well.  Denies SI/HI.    Hip pain:  I referred to PT last visit.  Right sided.  Radiates down to the knee.  No acute injury.  Bothers at rest.  Worse with exertion.  Has been doing more running recently.    Fatigue/Hypothyroidism:  Taking Synthroid.  Last checked 2025.  DUE for re-check.  Symptoms x months.  Has history of partial thyroidectomy in .  States she gets at least 7 hours of sleep.  Having increased fatigue.  Labs showed slightly low vit D in 2025.    Health maintenance:  Smoking: Former smoker.  Labs: 2025. DUE for TSH.  Influenza:    Social History     Tobacco Use    Smoking status: Former     Current packs/day: 0.00     Average packs/day: 0.3 packs/day for 10.0 years (2.5 ttl pk-yrs)     Types: Cigarettes     Start date:      Quit date: 2019     Years since quittin.4     Passive exposure: Never    Smokeless tobacco: Never   Vaping Use    Vaping status: Never Used   Substance Use Topics    Alcohol use: Yes     Comment: SOCIAL    Drug use: Never       Immunization History   Administered Date(s) Administered    Moderna SARS-CoV-2 Vaccination 2021, 2021, 2022     Review of Systems  12 point review of systems negative unless stated above in HPI    There were no vitals filed for this visit.    Physical Exam  General: Alert and oriented, well nourished, no acute distress.  Lungs: Clear to auscultation, non-labored respiration.  Heart: Normal rate, regular rhythm, no murmur, gallop or edema.  Neurologic: Awake, alert, and oriented X3, CN II-XII intact.  Psychiatric: Cooperative, appropriate mood and affect.    Assessment/Plan     Diagnoses and all orders for this visit:  Hypothyroidism, " unspecified type  Recurrent major depressive disorder, in partial remission  Anxiety  H/O thyroidectomy  Vitamin D deficiency  Right hip pain  Chronic fatigue

## 2025-07-07 PROBLEM — M25.551 RIGHT HIP PAIN: Status: ACTIVE | Noted: 2025-07-07

## 2025-07-07 NOTE — PROGRESS NOTES
"Subjective   Patient ID:   Leyla Villalpando \"J Carlos" is a 34 y.o. female who presents for Follow-up.  HPI  Depression/Anxiety:  We stopped Zoloft due to weight gain/ineffectiveness previously.  We switched to Wellbutrin.  This has maybe helped the depression a bit, but has caused increased irritability.  Symptoms x years.  Seeing a therapist as well.  Denies SI/HI.    Hip pain:  This has resolved!  I referred to PT last visit.  Right sided.  Radiates down to the knee.  No acute injury.  Bothers at rest.  Worse with exertion.  Has been doing more running recently.    Fatigue/Hypothyroidism:  Taking Synthroid.  Last checked 2025.  Has history of partial thyroidectomy in .  States she gets at least 7 hours of sleep.  Labs showed slightly low vit D in 2025.    Health maintenance:  Smoking: Former smoker.  Labs: 2025.  Influenza:    Social History     Tobacco Use    Smoking status: Former     Current packs/day: 0.00     Average packs/day: 0.3 packs/day for 10.3 years (2.6 ttl pk-yrs)     Types: Cigarettes     Start date: 10/1/2008     Quit date: 3/5/2019     Years since quittin.3     Passive exposure: Never    Smokeless tobacco: Never   Vaping Use    Vaping status: Never Used   Substance Use Topics    Alcohol use: Yes     Alcohol/week: 2.0 standard drinks of alcohol     Types: 2 Glasses of wine per week     Comment: SOCIAL    Drug use: Never       Immunization History   Administered Date(s) Administered    Moderna SARS-CoV-2 Vaccination 2021, 2021, 2022     Review of Systems  12 point review of systems negative unless stated above in HPI    Vitals:    25 0932   BP: 102/70   Pulse: 89   SpO2: 98%     Physical Exam  General: Alert and oriented, well nourished, no acute distress.  Lungs: Clear to auscultation, non-labored respiration.  Heart: Normal rate, regular rhythm, no murmur, gallop or edema.  Neurologic: Awake, alert, and oriented X3, CN II-XII intact.  Psychiatric: " Cooperative, appropriate mood and affect.    Assessment/Plan   It was good seeing you!  We have a couple options in regards to the Wellbutrin.  We can try off of it completely and see if the irritability improves.  We could then look to try an alternative treatment if need be.  OR, we can increase the dose of the Wellbutrin and see if the irritability gets worse or depression gets better.  Please give this some thought.  If symptoms persist or worsen despite current plan of care, please contact your healthcare provider for further evaluation.  Patient instructed to contact the office if there are any questions regarding their care or treatment.   [facility] [phone]    Fu in Jan for physical  Diagnoses and all orders for this visit:  Recurrent major depressive disorder, in partial remission  Anxiety  Hypothyroidism, unspecified type  H/O thyroidectomy  Chronic fatigue  Vitamin D deficiency  BMI 27.0-27.9,adult

## 2025-07-08 ENCOUNTER — APPOINTMENT (OUTPATIENT)
Dept: PRIMARY CARE | Facility: CLINIC | Age: 35
End: 2025-07-08
Payer: COMMERCIAL

## 2025-07-08 DIAGNOSIS — Z90.89 H/O THYROIDECTOMY: ICD-10-CM

## 2025-07-08 DIAGNOSIS — M25.551 RIGHT HIP PAIN: ICD-10-CM

## 2025-07-08 DIAGNOSIS — F41.9 ANXIETY: ICD-10-CM

## 2025-07-08 DIAGNOSIS — R53.82 CHRONIC FATIGUE: ICD-10-CM

## 2025-07-08 DIAGNOSIS — E55.9 VITAMIN D DEFICIENCY: ICD-10-CM

## 2025-07-08 DIAGNOSIS — Z98.890 H/O THYROIDECTOMY: ICD-10-CM

## 2025-07-08 DIAGNOSIS — F33.41 RECURRENT MAJOR DEPRESSIVE DISORDER, IN PARTIAL REMISSION: ICD-10-CM

## 2025-07-08 DIAGNOSIS — E03.9 HYPOTHYROIDISM, UNSPECIFIED TYPE: Primary | ICD-10-CM

## 2025-07-16 ENCOUNTER — OFFICE VISIT (OUTPATIENT)
Dept: PRIMARY CARE | Facility: CLINIC | Age: 35
End: 2025-07-16
Payer: COMMERCIAL

## 2025-07-16 VITALS
HEIGHT: 66 IN | OXYGEN SATURATION: 98 % | BODY MASS INDEX: 26.93 KG/M2 | WEIGHT: 167.6 LBS | HEART RATE: 89 BPM | DIASTOLIC BLOOD PRESSURE: 70 MMHG | SYSTOLIC BLOOD PRESSURE: 102 MMHG

## 2025-07-16 DIAGNOSIS — F41.9 ANXIETY: ICD-10-CM

## 2025-07-16 DIAGNOSIS — E55.9 VITAMIN D DEFICIENCY: ICD-10-CM

## 2025-07-16 DIAGNOSIS — Z98.890 H/O THYROIDECTOMY: ICD-10-CM

## 2025-07-16 DIAGNOSIS — F33.41 RECURRENT MAJOR DEPRESSIVE DISORDER, IN PARTIAL REMISSION: Primary | ICD-10-CM

## 2025-07-16 DIAGNOSIS — E03.9 HYPOTHYROIDISM, UNSPECIFIED TYPE: ICD-10-CM

## 2025-07-16 DIAGNOSIS — R53.82 CHRONIC FATIGUE: ICD-10-CM

## 2025-07-16 DIAGNOSIS — Z90.89 H/O THYROIDECTOMY: ICD-10-CM

## 2025-07-16 PROBLEM — M25.551 RIGHT HIP PAIN: Status: RESOLVED | Noted: 2025-07-07 | Resolved: 2025-07-16

## 2025-07-16 PROCEDURE — 3008F BODY MASS INDEX DOCD: CPT | Performed by: PHYSICIAN ASSISTANT

## 2025-07-16 PROCEDURE — 99213 OFFICE O/P EST LOW 20 MIN: CPT | Performed by: PHYSICIAN ASSISTANT

## 2025-07-16 ASSESSMENT — PAIN SCALES - GENERAL: PAINLEVEL_OUTOF10: 0-NO PAIN

## 2025-07-21 ENCOUNTER — OFFICE VISIT (OUTPATIENT)
Facility: CLINIC | Age: 35
End: 2025-07-21
Payer: COMMERCIAL

## 2025-07-21 VITALS
HEIGHT: 66 IN | BODY MASS INDEX: 26.93 KG/M2 | WEIGHT: 167.6 LBS | SYSTOLIC BLOOD PRESSURE: 116 MMHG | DIASTOLIC BLOOD PRESSURE: 72 MMHG

## 2025-07-21 DIAGNOSIS — Z01.419 WELL WOMAN EXAM WITH ROUTINE GYNECOLOGICAL EXAM: Primary | ICD-10-CM

## 2025-07-21 DIAGNOSIS — Z30.41 FAMILY PLANNING, BCP (BIRTH CONTROL PILLS) MAINTENANCE: ICD-10-CM

## 2025-07-21 PROCEDURE — 99395 PREV VISIT EST AGE 18-39: CPT | Performed by: OBSTETRICS & GYNECOLOGY

## 2025-07-21 PROCEDURE — 3008F BODY MASS INDEX DOCD: CPT | Performed by: OBSTETRICS & GYNECOLOGY

## 2025-07-21 RX ORDER — DROSPIRENONE AND ETHINYL ESTRADIOL 0.02-3(28)
1 KIT ORAL DAILY
Qty: 90 TABLET | Refills: 3 | Status: SHIPPED | OUTPATIENT
Start: 2025-07-21 | End: 2026-07-21

## 2025-07-21 ASSESSMENT — ENCOUNTER SYMPTOMS
OCCASIONAL FEELINGS OF UNSTEADINESS: 0
DEPRESSION: 0
LOSS OF SENSATION IN FEET: 0

## 2025-07-21 ASSESSMENT — PAIN SCALES - GENERAL: PAINLEVEL_OUTOF10: 0-NO PAIN

## 2025-07-21 NOTE — PROGRESS NOTES
"Estela Villalpando \"J Carlos" is a 34 y.o. female who is here for a routine exam. Periods are {gyn period regularity:715}, lasting {numbers; 0-10:36450} days. Dysmenorrhea:{gyn dysmenorrhea:716}. Cyclic symptoms include {sys gyn cyclic sx:76028}. No intermenstrual bleeding, spotting, or discharge.    History of Present Illness    Last pap:    Last mammogram  Current contraception: {contraceptive method:5051}  History of abnormal Pap smear: {yes***/no:62599::\"no\"}  Family history of uterine or ovarian cancer: {yes***/no:09750::\"no\"}  Regular self breast exam: {yes/no:63}  History of abnormal mammogram: {yes***/no:19284::\"no\"}  Family history of breast cancer: {yes***/no:53905::\"no\"}  History of abnormal lipids: {yes***/no:55695::\"no\"}  Menstrual History:  OB History          1    Para        Term                AB   1    Living             SAB        IAB   1    Ectopic        Multiple        Live Births                    Menarche age: ***  Patient's last menstrual period was 2025 (approximate).         Medical History[1]    Surgical History[2]    Social History     Socioeconomic History    Marital status: Significant Other     Spouse name: Not on file    Number of children: 0    Years of education: Not on file    Highest education level: Not on file   Occupational History    Not on file   Tobacco Use    Smoking status: Former     Current packs/day: 0.00     Average packs/day: 0.3 packs/day for 10.3 years (2.6 ttl pk-yrs)     Types: Cigarettes     Start date: 10/1/2008     Quit date: 3/5/2019     Years since quittin.3     Passive exposure: Never    Smokeless tobacco: Never   Vaping Use    Vaping status: Never Used   Substance and Sexual Activity    Alcohol use: Yes     Alcohol/week: 2.0 standard drinks of alcohol     Types: 2 Glasses of wine per week     Comment: SOCIAL    Drug use: Never    Sexual activity: Yes     Partners: Male     Birth control/protection: OCP   Other Topics Concern " "   Not on file   Social History Narrative    Not on file     Social Drivers of Health     Financial Resource Strain: Not on file   Food Insecurity: Not on file   Transportation Needs: Not on file   Physical Activity: Not on file   Stress: Not on file   Social Connections: Not on file   Intimate Partner Violence: Not on file   Housing Stability: Not on file       Family History[3]    Prior to Admission medications   Medication Sig Start Date End Date Taking? Authorizing Provider   buPROPion XL (Wellbutrin XL) 150 mg 24 hr tablet Take 1 tablet (150 mg) by mouth once daily in the morning for 15 days. Do not crush, chew, or split. 6/25/25 7/21/25 Yes Wood An PA-C   drospirenone-ethinyl estradiol (Vestura, 28,) 3-0.02 mg tablet Take 1 tablet by mouth once daily. 6/12/25 6/12/26 Yes Mary Mendoza, DO   levothyroxine (Synthroid, Levoxyl) 75 mcg tablet Take 1 tablet (75 mcg) by mouth once daily in the morning. Take before meals for 15 days. 6/25/25 7/21/25 Yes Wood An PA-C       Allergies[4]           Objective   /72   Ht 1.676 m (5' 6\")   Wt 76 kg (167 lb 9.6 oz)   LMP 05/14/2025 (Approximate) Comment: irregular due to meds  BMI 27.05 kg/m²     Physical Exam      Assessment & Plan      Assessment    Problem List Items Addressed This Visit    None       No follow-ups on file.   {gyn plan:06945}.    This medical note was created with the assistance of artificial intelligence (AI) for documentation purposes. The content has been reviewed and confirmed by the healthcare provider for accuracy and completeness. Patient consented to the use of audio recording and use of AI during their visit.        [1]   Past Medical History:  Diagnosis Date    Anxiety     Depression 2021    H/O partial thyroidectomy    [2]   Past Surgical History:  Procedure Laterality Date    THYROIDECTOMY, PARTIAL      WISDOM TOOTH EXTRACTION     [3]   Family History  Problem Relation Name Age of Onset    Alcohol abuse Mother " Maria L < Solitario     Alcohol abuse Maternal Grandfather Sravan Wright    [4]   Allergies  Allergen Reactions    Loratadine Hives and Rash    Dog Dander Unknown    Duck Feathers Allergenic Extract Unknown    Grass Pollen-[place], Standard Unknown    House Dust Mite Unknown    Mold Unknown    Other Unknown    Penicillin V Hives     Pill form    Ragweed Unknown    Weed Pollen-Short Ragweed Unknown      differential.  Discussed healthy lifestyle modifications..    This medical note was created with the assistance of artificial intelligence (AI) for documentation purposes. The content has been reviewed and confirmed by the healthcare provider for accuracy and completeness. Patient consented to the use of audio recording and use of AI during their visit.          [1]   Past Medical History:  Diagnosis Date    Anxiety     Depression 2021    H/O partial thyroidectomy    [2]   Past Surgical History:  Procedure Laterality Date    THYROIDECTOMY, PARTIAL      WISDOM TOOTH EXTRACTION     [3]   Family History  Problem Relation Name Age of Onset    Alcohol abuse Mother Maria L < Solitario     Alcohol abuse Maternal Grandfather Sravan Wright    [4]   Allergies  Allergen Reactions    Loratadine Hives and Rash    Dog Dander Unknown    Duck Feathers Allergenic Extract Unknown    Grass Pollen-[place], Standard Unknown    House Dust Mite Unknown    Mold Unknown    Other Unknown    Penicillin V Hives     Pill form    Ragweed Unknown    Weed Pollen-Short Ragweed Unknown

## 2025-08-12 ENCOUNTER — APPOINTMENT (OUTPATIENT)
Facility: CLINIC | Age: 35
End: 2025-08-12
Payer: COMMERCIAL

## 2025-08-30 DIAGNOSIS — Z30.41 FAMILY PLANNING, BCP (BIRTH CONTROL PILLS) MAINTENANCE: ICD-10-CM

## 2025-09-05 RX ORDER — DROSPIRENONE AND ETHINYL ESTRADIOL 0.02-3(28)
1 KIT ORAL DAILY
Qty: 84 TABLET | Refills: 3 | Status: SHIPPED | OUTPATIENT
Start: 2025-09-05

## 2025-10-15 ENCOUNTER — APPOINTMENT (OUTPATIENT)
Dept: PODIATRY | Facility: CLINIC | Age: 35
End: 2025-10-15
Payer: COMMERCIAL

## 2026-01-15 ENCOUNTER — APPOINTMENT (OUTPATIENT)
Dept: PRIMARY CARE | Facility: CLINIC | Age: 36
End: 2026-01-15
Payer: COMMERCIAL